# Patient Record
Sex: MALE | URBAN - METROPOLITAN AREA
[De-identification: names, ages, dates, MRNs, and addresses within clinical notes are randomized per-mention and may not be internally consistent; named-entity substitution may affect disease eponyms.]

---

## 2024-06-18 ENCOUNTER — LAB OUTSIDE AN ENCOUNTER (OUTPATIENT)
Dept: URBAN - METROPOLITAN AREA CLINIC 27 | Facility: CLINIC | Age: 74
End: 2024-06-18

## 2024-06-18 ENCOUNTER — DASHBOARD ENCOUNTERS (OUTPATIENT)
Age: 74
End: 2024-06-18

## 2024-06-18 ENCOUNTER — OFFICE VISIT (OUTPATIENT)
Dept: URBAN - METROPOLITAN AREA CLINIC 27 | Facility: CLINIC | Age: 74
End: 2024-06-18
Payer: COMMERCIAL

## 2024-06-18 VITALS
DIASTOLIC BLOOD PRESSURE: 82 MMHG | BODY MASS INDEX: 24.79 KG/M2 | HEIGHT: 72 IN | HEART RATE: 87 BPM | WEIGHT: 183 LBS | SYSTOLIC BLOOD PRESSURE: 159 MMHG

## 2024-06-18 DIAGNOSIS — R10.13 EPIGASTRIC PAIN: ICD-10-CM

## 2024-06-18 DIAGNOSIS — R10.84 ABDOMINAL CRAMPING, GENERALIZED: ICD-10-CM

## 2024-06-18 PROBLEM — 79922009: Status: ACTIVE | Noted: 2024-06-18

## 2024-06-18 PROBLEM — 247355005: Status: ACTIVE | Noted: 2024-06-18

## 2024-06-18 PROCEDURE — 99204 OFFICE O/P NEW MOD 45 MIN: CPT | Performed by: INTERNAL MEDICINE

## 2024-06-18 NOTE — HPI-TODAY'S VISIT:
Mr. Elizabeth is a 73 YOM new patient here for evaluation of back and abdominal pain with Dr. Herrera. He is a chiropractor. Two months ago he began having back pain in the T8-T10 R paraspinal area. No precipitating event; pain is better when he lies down, worse when he burps. He also notes new onset epigatric discomfort. Pain is worse with eating, was improved somewhat with a 2 wks course of Prilosec and low acid coffee. No overt reflux or dysphagia; no NSAID use. He has a hiatal hernia (per MRI), has never had an EGD. Weight is stable, stools are normal without melena or hematochezia. Last colonoscopy in 2015 was apparently normal w/no hx of polyps. He has h/o MRSA, and spinal MRI has shown thoracic Modic changes.

## 2024-06-25 ENCOUNTER — TELEPHONE ENCOUNTER (OUTPATIENT)
Dept: URBAN - METROPOLITAN AREA CLINIC 27 | Facility: CLINIC | Age: 74
End: 2024-06-25

## 2024-06-25 LAB — H PYLORI BREATH TEST: NOT DETECTED

## 2024-07-05 ENCOUNTER — WEB ENCOUNTER (OUTPATIENT)
Dept: URBAN - METROPOLITAN AREA CLINIC 27 | Facility: CLINIC | Age: 74
End: 2024-07-05

## 2024-07-09 ENCOUNTER — WEB ENCOUNTER (OUTPATIENT)
Dept: URBAN - METROPOLITAN AREA CLINIC 27 | Facility: CLINIC | Age: 74
End: 2024-07-09

## 2024-07-15 ENCOUNTER — WEB ENCOUNTER (OUTPATIENT)
Dept: URBAN - METROPOLITAN AREA CLINIC 27 | Facility: CLINIC | Age: 74
End: 2024-07-15

## 2024-07-15 ENCOUNTER — LAB OUTSIDE AN ENCOUNTER (OUTPATIENT)
Dept: URBAN - METROPOLITAN AREA CLINIC 27 | Facility: CLINIC | Age: 74
End: 2024-07-15

## 2024-07-16 ENCOUNTER — TELEPHONE ENCOUNTER (OUTPATIENT)
Dept: URBAN - METROPOLITAN AREA CLINIC 27 | Facility: CLINIC | Age: 74
End: 2024-07-16

## 2024-07-26 ENCOUNTER — LAB OUTSIDE AN ENCOUNTER (OUTPATIENT)
Dept: URBAN - METROPOLITAN AREA CLINIC 27 | Facility: CLINIC | Age: 74
End: 2024-07-26

## 2024-07-27 LAB
A/G RATIO: 1.1
ABSOLUTE BASOPHILS: 58
ABSOLUTE EOSINOPHILS: 215
ABSOLUTE LYMPHOCYTES: 1241
ABSOLUTE MONOCYTES: 667
ABSOLUTE NEUTROPHILS: 3619
ALBUMIN: 3.9
ALKALINE PHOSPHATASE: 75
ALT (SGPT): 18
AMYLASE: 26
AST (SGOT): 23
BASOPHILS: 1
BILIRUBIN, TOTAL: 0.6
BUN/CREATININE RATIO: (no result)
BUN: 18
CALCIUM: 9.3
CARBON DIOXIDE, TOTAL: 26
CHLORIDE: 101
CREATININE: 0.98
EGFR: 81
EOSINOPHILS: 3.7
GLOBULIN, TOTAL: 3.4
GLUCOSE: 110
HEMATOCRIT: 42.9
HEMOGLOBIN: 14
LIPASE: 25
LYMPHOCYTES: 21.4
MCH: 29.7
MCHC: 32.6
MCV: 90.9
MONOCYTES: 11.5
MPV: 11.6
NEUTROPHILS: 62.4
PLATELET COUNT: 266
POTASSIUM: 4.1
PROTEIN, TOTAL: 7.3
RDW: 13.7
RED BLOOD CELL COUNT: 4.72
SODIUM: 137
WHITE BLOOD CELL COUNT: 5.8

## 2024-07-30 ENCOUNTER — WEB ENCOUNTER (OUTPATIENT)
Dept: URBAN - METROPOLITAN AREA CLINIC 27 | Facility: CLINIC | Age: 74
End: 2024-07-30

## 2024-07-30 ENCOUNTER — OFFICE VISIT (OUTPATIENT)
Dept: URBAN - METROPOLITAN AREA MEDICAL CENTER 8 | Facility: MEDICAL CENTER | Age: 74
End: 2024-07-30

## 2024-08-05 ENCOUNTER — TELEPHONE ENCOUNTER (OUTPATIENT)
Dept: URBAN - METROPOLITAN AREA CLINIC 27 | Facility: CLINIC | Age: 74
End: 2024-08-05

## 2024-08-06 ENCOUNTER — OFFICE VISIT (OUTPATIENT)
Dept: URBAN - METROPOLITAN AREA CLINIC 27 | Facility: CLINIC | Age: 74
End: 2024-08-06
Payer: COMMERCIAL

## 2024-08-06 ENCOUNTER — TELEPHONE ENCOUNTER (OUTPATIENT)
Dept: URBAN - METROPOLITAN AREA CLINIC 27 | Facility: CLINIC | Age: 74
End: 2024-08-06

## 2024-08-06 ENCOUNTER — LAB OUTSIDE AN ENCOUNTER (OUTPATIENT)
Dept: URBAN - METROPOLITAN AREA CLINIC 27 | Facility: CLINIC | Age: 74
End: 2024-08-06

## 2024-08-06 VITALS
DIASTOLIC BLOOD PRESSURE: 69 MMHG | HEART RATE: 86 BPM | SYSTOLIC BLOOD PRESSURE: 129 MMHG | HEIGHT: 72 IN | BODY MASS INDEX: 24.11 KG/M2 | WEIGHT: 178 LBS

## 2024-08-06 DIAGNOSIS — K21.9 GERD: ICD-10-CM

## 2024-08-06 DIAGNOSIS — R10.9 FLANK PAIN: ICD-10-CM

## 2024-08-06 DIAGNOSIS — R93.5 ABNORMAL ABDOMINAL CT SCAN: ICD-10-CM

## 2024-08-06 DIAGNOSIS — R10.13 EPIGASTRIC PAIN: ICD-10-CM

## 2024-08-06 PROCEDURE — 99214 OFFICE O/P EST MOD 30 MIN: CPT | Performed by: INTERNAL MEDICINE

## 2024-08-06 NOTE — HPI-TODAY'S VISIT:
This is a 73-year-old male seen in follow-up consultation after being treated for reflux and dyspepsia.  He was having discomfort and after upper endoscopy subsequently underwent a CT scan.  Today he states that the use of Pepcid has made a big difference.  When he uses it the epigastric symptoms resolved in addition to the back discomfort.  He has had a previous MRI of the back and is being followed by his orthopedist.  Incidentally marked lymphadenopathy was noted in the right lower quadrant with enlarged lymph nodes.  He gives a history of a right knee joint replacement in the past.  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  He has been taking rifampin which she stopped last December.  He is otherwise feeling well without complaints upper endoscopy was unrevealing and biopsies were unrevealing

## 2024-08-19 ENCOUNTER — OFFICE VISIT (OUTPATIENT)
Dept: URBAN - METROPOLITAN AREA CLINIC 27 | Facility: CLINIC | Age: 74
End: 2024-08-19

## 2024-09-05 ENCOUNTER — OFFICE VISIT (OUTPATIENT)
Dept: URBAN - METROPOLITAN AREA CLINIC 27 | Facility: CLINIC | Age: 74
End: 2024-09-05
Payer: COMMERCIAL

## 2024-09-05 VITALS
BODY MASS INDEX: 24.24 KG/M2 | WEIGHT: 179 LBS | HEIGHT: 72 IN | HEART RATE: 108 BPM | DIASTOLIC BLOOD PRESSURE: 80 MMHG | SYSTOLIC BLOOD PRESSURE: 128 MMHG

## 2024-09-05 DIAGNOSIS — K21.9 GERD: ICD-10-CM

## 2024-09-05 DIAGNOSIS — R93.5 ABNORMAL ABDOMINAL CT SCAN: ICD-10-CM

## 2024-09-05 PROCEDURE — 99213 OFFICE O/P EST LOW 20 MIN: CPT | Performed by: INTERNAL MEDICINE

## 2024-09-05 NOTE — HPI-TODAY'S VISIT:
Is a 74-year-old male seen in follow-up consultation for his chronic reflux symptoms.  He does find that he responds to Pepcid but does not want to take the Pepcid regularly.  He will have symptoms when he does not take it.  It does wake him up at 2:30 in the morning.  No vomiting.  He is also following up from recent biopsy of a groin lymph node based on findings on CT scan.  Unfortunately the biopsy showed fibroadipose tissue with little to none lymphoid tissue so therefore flow cytometry could not be obtained.  He is not complaining of any discomfort in the right lower quadrant.  He does have a history of chronic right knee infection but that is been stable recently.  He has chronic back pain.  He would like a Medrol Dosepak to help with that.  He also would like a refill on his zolpidem for sleep.

## 2024-09-06 ENCOUNTER — TELEPHONE ENCOUNTER (OUTPATIENT)
Dept: URBAN - METROPOLITAN AREA CLINIC 27 | Facility: CLINIC | Age: 74
End: 2024-09-06

## 2024-09-06 RX ORDER — ZOLPIDEM TARTRATE 10 MG/1
1 TABLET AT BEDTIME AS NEEDED TABLET, FILM COATED ORAL ONCE A DAY
Qty: 30 TABLET | Refills: 1 | OUTPATIENT
Start: 2024-09-06 | End: 2024-11-04

## 2024-09-06 RX ORDER — METHYLPREDNISOLONE 4 MG/1
4 PILLS FIRST DAY, THEN 3 PILLS SECOND DAY, THEN 2PILLS FOR A DAY, THEN ONE PILL A DAY TABLET ORAL
Qty: 14 | Refills: 0 | OUTPATIENT
Start: 2024-09-06 | End: 2024-09-11

## 2024-09-24 ENCOUNTER — TELEPHONE ENCOUNTER (OUTPATIENT)
Dept: URBAN - METROPOLITAN AREA CLINIC 27 | Facility: CLINIC | Age: 74
End: 2024-09-24

## 2024-10-16 ENCOUNTER — WEB ENCOUNTER (OUTPATIENT)
Dept: URBAN - METROPOLITAN AREA CLINIC 27 | Facility: CLINIC | Age: 74
End: 2024-10-16

## 2024-10-18 ENCOUNTER — WEB ENCOUNTER (OUTPATIENT)
Dept: URBAN - METROPOLITAN AREA CLINIC 27 | Facility: CLINIC | Age: 74
End: 2024-10-18

## 2024-10-30 ENCOUNTER — TELEPHONE ENCOUNTER (OUTPATIENT)
Dept: URBAN - METROPOLITAN AREA CLINIC 27 | Facility: CLINIC | Age: 74
End: 2024-10-30

## 2024-10-30 ENCOUNTER — LAB OUTSIDE AN ENCOUNTER (OUTPATIENT)
Dept: URBAN - METROPOLITAN AREA CLINIC 27 | Facility: CLINIC | Age: 74
End: 2024-10-30

## 2024-10-31 LAB
FOLATE, SERUM: 17.2
HEMATOCRIT: 42.4
HEMOGLOBIN: 13.5
MCH: 28.7
MCHC: 31.8
MCV: 90.2
MPV: 11.5
PLATELET COUNT: 292
RDW: 13.4
RED BLOOD CELL COUNT: 4.7
VITAMIN B12: 549
WHITE BLOOD CELL COUNT: 5.6

## 2024-12-17 ENCOUNTER — OFFICE VISIT (OUTPATIENT)
Dept: URBAN - METROPOLITAN AREA MEDICAL CENTER 8 | Facility: MEDICAL CENTER | Age: 74
End: 2024-12-17
Payer: COMMERCIAL

## 2024-12-17 DIAGNOSIS — Z53.8 FAILED ATTEMPTED SURGICAL PROCEDURE: ICD-10-CM

## 2024-12-17 DIAGNOSIS — Z12.11 COLON CANCER SCREENING: ICD-10-CM

## 2024-12-17 PROCEDURE — G0121 COLON CA SCRN NOT HI RSK IND: HCPCS | Performed by: INTERNAL MEDICINE

## 2024-12-20 ENCOUNTER — TELEPHONE ENCOUNTER (OUTPATIENT)
Dept: URBAN - METROPOLITAN AREA CLINIC 27 | Facility: CLINIC | Age: 74
End: 2024-12-20

## 2025-07-07 ENCOUNTER — TELEPHONE ENCOUNTER (OUTPATIENT)
Dept: URBAN - METROPOLITAN AREA CLINIC 27 | Facility: CLINIC | Age: 75
End: 2025-07-07

## 2025-07-07 ENCOUNTER — OFFICE VISIT (OUTPATIENT)
Dept: URBAN - METROPOLITAN AREA CLINIC 27 | Facility: CLINIC | Age: 75
End: 2025-07-07
Payer: COMMERCIAL

## 2025-07-07 DIAGNOSIS — K64.9 HEMORRHOID: ICD-10-CM

## 2025-07-07 DIAGNOSIS — I10 HYPERTENSION, ESSENTIAL: ICD-10-CM

## 2025-07-07 DIAGNOSIS — R93.5 ABNORMAL ABDOMINAL CT SCAN: ICD-10-CM

## 2025-07-07 PROCEDURE — 99214 OFFICE O/P EST MOD 30 MIN: CPT | Performed by: INTERNAL MEDICINE

## 2025-07-07 RX ORDER — EZETIMIBE 10 MG/1
1 TABLET TABLET ORAL ONCE A DAY
Qty: 30 | Status: ACTIVE | COMMUNITY

## 2025-07-07 NOTE — HPI-TODAY'S VISIT:
this is a 74-year-old male seen in follow-up consultation for recent CT scan.  He had a colonoscopy in 1224.  There was a poor prep and recommendation to repeat with extended preparation in 3 months.  He recently had a CT scan.  He has a history of lymph nodes in his pelvic region that have been chronic and stable.  This was a noncontrast CT and there is a question of stool versus mass within the cecum.  He is not losing any weight.  He has regular bowel movements no current issues.  He does have hemorrhoids and wonders whether banding would be an option.  The lymph nodes measured 1.7 and 1.2 cm.

## 2025-07-08 ENCOUNTER — LAB OUTSIDE AN ENCOUNTER (OUTPATIENT)
Dept: URBAN - METROPOLITAN AREA CLINIC 27 | Facility: CLINIC | Age: 75
End: 2025-07-08

## 2025-07-24 ENCOUNTER — OFFICE VISIT (OUTPATIENT)
Dept: URBAN - METROPOLITAN AREA CLINIC 27 | Facility: CLINIC | Age: 75
End: 2025-07-24

## 2025-07-29 ENCOUNTER — OFFICE VISIT (OUTPATIENT)
Dept: URBAN - METROPOLITAN AREA MEDICAL CENTER 8 | Facility: MEDICAL CENTER | Age: 75
End: 2025-07-29

## 2025-07-29 RX ORDER — EZETIMIBE 10 MG/1
1 TABLET TABLET ORAL ONCE A DAY
Qty: 30 | Status: ACTIVE | COMMUNITY